# Patient Record
Sex: FEMALE
[De-identification: names, ages, dates, MRNs, and addresses within clinical notes are randomized per-mention and may not be internally consistent; named-entity substitution may affect disease eponyms.]

---

## 2021-11-19 ENCOUNTER — RX ONLY (RX ONLY)
Age: 66
End: 2021-11-19

## 2021-11-19 ENCOUNTER — OTHER- (OUTPATIENT)
Dept: URBAN - NONMETROPOLITAN AREA CLINIC 5 | Facility: CLINIC | Age: 66
Setting detail: DERMATOLOGY
End: 2021-11-19

## 2021-11-19 DIAGNOSIS — D48.5 NEOPLASM OF UNCERTAIN BEHAVIOR OF SKIN: ICD-10-CM

## 2021-11-19 PROCEDURE — 99203 OFFICE O/P NEW LOW 30 MIN: CPT

## 2021-11-19 RX ORDER — TRIAMCINOLONE ACETONIDE 1 MG/G
1 AS DIRECTED CREAM TOPICAL TWICE A DAY
Qty: 453.6 | Refills: 0
Start: 2021-11-19

## 2021-12-08 ENCOUNTER — FOLLOW-UP (OUTPATIENT)
Dept: URBAN - METROPOLITAN AREA CLINIC 8 | Facility: CLINIC | Age: 66
Setting detail: DERMATOLOGY
End: 2021-12-08

## 2021-12-08 DIAGNOSIS — L81.1 CHLOASMA: ICD-10-CM

## 2021-12-08 DIAGNOSIS — Z08 ENCOUNTER FOR FOLLOW-UP EXAMINATION AFTER COMPLETED TREATMENT FOR MALIGNANT NEOPLASM: ICD-10-CM

## 2021-12-08 DIAGNOSIS — L71.8 OTHER ROSACEA: ICD-10-CM

## 2021-12-08 PROCEDURE — 96900 ACTINOTHERAPY UV LIGHT: CPT

## 2021-12-08 PROCEDURE — 99213 OFFICE O/P EST LOW 20 MIN: CPT

## 2021-12-13 ENCOUNTER — FOLLOW-UP (OUTPATIENT)
Dept: URBAN - METROPOLITAN AREA CLINIC 8 | Facility: CLINIC | Age: 66
Setting detail: DERMATOLOGY
End: 2021-12-13

## 2021-12-13 DIAGNOSIS — L57.0 ACTINIC KERATOSIS: ICD-10-CM

## 2021-12-13 PROCEDURE — 96900 ACTINOTHERAPY UV LIGHT: CPT

## 2021-12-22 ENCOUNTER — FOLLOW-UP (OUTPATIENT)
Dept: URBAN - METROPOLITAN AREA CLINIC 15 | Facility: CLINIC | Age: 66
Setting detail: DERMATOLOGY
End: 2021-12-22

## 2021-12-22 DIAGNOSIS — L23.9 ALLERGIC CONTACT DERMATITIS, UNSPECIFIED CAUSE: ICD-10-CM

## 2021-12-22 PROCEDURE — 17110 DESTRUCT B9 LESION 1-14: CPT

## 2021-12-22 PROCEDURE — 99212 OFFICE O/P EST SF 10 MIN: CPT

## 2023-12-20 ENCOUNTER — APPOINTMENT (OUTPATIENT)
Dept: URBAN - METROPOLITAN AREA CLINIC 216 | Age: 68
Setting detail: DERMATOLOGY
End: 2023-12-20

## 2023-12-20 DIAGNOSIS — L82.1 OTHER SEBORRHEIC KERATOSIS: ICD-10-CM

## 2023-12-20 DIAGNOSIS — L81.4 OTHER MELANIN HYPERPIGMENTATION: ICD-10-CM

## 2023-12-20 DIAGNOSIS — D18.0 HEMANGIOMA: ICD-10-CM

## 2023-12-20 DIAGNOSIS — D22 MELANOCYTIC NEVI: ICD-10-CM

## 2023-12-20 DIAGNOSIS — D485 NEOPLASM OF UNCERTAIN BEHAVIOR OF SKIN: ICD-10-CM

## 2023-12-20 PROBLEM — D22.9 MELANOCYTIC NEVI, UNSPECIFIED: Status: ACTIVE | Noted: 2023-12-20

## 2023-12-20 PROBLEM — D18.01 HEMANGIOMA OF SKIN AND SUBCUTANEOUS TISSUE: Status: ACTIVE | Noted: 2023-12-20

## 2023-12-20 PROBLEM — D48.5 NEOPLASM OF UNCERTAIN BEHAVIOR OF SKIN: Status: ACTIVE | Noted: 2023-12-20

## 2023-12-20 PROCEDURE — OTHER BIOPSY BY SHAVE METHOD: OTHER

## 2023-12-20 PROCEDURE — OTHER REASSURANCE: OTHER

## 2023-12-20 PROCEDURE — 11102 TANGNTL BX SKIN SINGLE LES: CPT

## 2023-12-20 PROCEDURE — OTHER COUNSELING: OTHER

## 2023-12-20 PROCEDURE — 99213 OFFICE O/P EST LOW 20 MIN: CPT | Mod: 25

## 2023-12-20 ASSESSMENT — LOCATION DETAILED DESCRIPTION DERM
LOCATION DETAILED: RIGHT CENTRAL MALAR CHEEK
LOCATION DETAILED: LEFT SUPERIOR UPPER BACK
LOCATION DETAILED: LEFT ANTERIOR SHOULDER
LOCATION DETAILED: LEFT DISTAL POSTERIOR UPPER ARM
LOCATION DETAILED: RIGHT MEDIAL SUPERIOR CHEST
LOCATION DETAILED: RIGHT ANTERIOR SHOULDER
LOCATION DETAILED: LEFT SUPERIOR LATERAL UPPER BACK
LOCATION DETAILED: INFERIOR THORACIC SPINE

## 2023-12-20 ASSESSMENT — LOCATION SIMPLE DESCRIPTION DERM
LOCATION SIMPLE: RIGHT SHOULDER
LOCATION SIMPLE: RIGHT CHEEK
LOCATION SIMPLE: LEFT SHOULDER
LOCATION SIMPLE: CHEST
LOCATION SIMPLE: LEFT POSTERIOR UPPER ARM
LOCATION SIMPLE: UPPER BACK
LOCATION SIMPLE: LEFT UPPER BACK

## 2023-12-20 ASSESSMENT — LOCATION ZONE DERM
LOCATION ZONE: TRUNK
LOCATION ZONE: FACE
LOCATION ZONE: ARM

## 2023-12-20 NOTE — PROCEDURE: BIOPSY BY SHAVE METHOD
Detail Level: Detailed
Depth Of Biopsy: dermis
Was A Bandage Applied: Yes
Size Of Lesion In Cm: 0
Biopsy Type: H and E
Biopsy Method: double edge Personna blade
Anesthesia Type: 1% lidocaine with epinephrine
Anesthesia Volume In Cc: 0.5
Hemostasis: Drysol
Wound Care: Petrolatum
Dressing: Band-Aid
Destruction After The Procedure: No
Type Of Destruction Used: Curettage
Curettage Text: The wound bed was treated with curettage after the biopsy was performed.
Cryotherapy Text: The wound bed was treated with cryotherapy after the biopsy was performed.
Electrodesiccation Text: The wound bed was treated with electrodesiccation after the biopsy was performed.
Electrodesiccation And Curettage Text: The wound bed was treated with electrodesiccation and curettage after the biopsy was performed.
Silver Nitrate Text: The wound bed was treated with silver nitrate after the biopsy was performed.
Lab: [id number]
Consent: Written consent was obtained and risks were reviewed including but not limited to scarring, infection, bleeding, scabbing, incomplete removal, nerve damage and allergy to anesthesia.
Post-Care Instructions: I reviewed with the patient in detail post-care instructions. Patient is to clean the wound with soap and water twice daily, and then apply Vaseline and keep covered, until healed.
Notification Instructions: Patient will be notified of biopsy results. However, patient instructed to call the office if not contacted within 2 weeks.
Billing Type: Third-Party Bill
Information: Selecting Yes will display possible errors in your note based on the variables you have selected. This validation is only offered as a suggestion for you. PLEASE NOTE THAT THE VALIDATION TEXT WILL BE REMOVED WHEN YOU FINALIZE YOUR NOTE. IF YOU WANT TO FAX A PRELIMINARY NOTE YOU WILL NEED TO TOGGLE THIS TO 'NO' IF YOU DO NOT WANT IT IN YOUR FAXED NOTE.

## 2025-01-06 ENCOUNTER — APPOINTMENT (OUTPATIENT)
Dept: URBAN - METROPOLITAN AREA CLINIC 216 | Age: 70
Setting detail: DERMATOLOGY
End: 2025-01-07

## 2025-01-06 DIAGNOSIS — R20.2 PARESTHESIA OF SKIN: ICD-10-CM

## 2025-01-06 DIAGNOSIS — D18.0 HEMANGIOMA: ICD-10-CM

## 2025-01-06 DIAGNOSIS — D22 MELANOCYTIC NEVI: ICD-10-CM

## 2025-01-06 DIAGNOSIS — L82.1 OTHER SEBORRHEIC KERATOSIS: ICD-10-CM

## 2025-01-06 DIAGNOSIS — L81.4 OTHER MELANIN HYPERPIGMENTATION: ICD-10-CM

## 2025-01-06 PROBLEM — D22.9 MELANOCYTIC NEVI, UNSPECIFIED: Status: ACTIVE | Noted: 2025-01-06

## 2025-01-06 PROBLEM — D18.01 HEMANGIOMA OF SKIN AND SUBCUTANEOUS TISSUE: Status: ACTIVE | Noted: 2025-01-06

## 2025-01-06 PROCEDURE — 99213 OFFICE O/P EST LOW 20 MIN: CPT

## 2025-01-06 PROCEDURE — OTHER REASSURANCE: OTHER

## 2025-01-06 PROCEDURE — OTHER COUNSELING: OTHER

## 2025-01-06 ASSESSMENT — LOCATION SIMPLE DESCRIPTION DERM
LOCATION SIMPLE: LEFT SHOULDER
LOCATION SIMPLE: CHEST
LOCATION SIMPLE: UPPER BACK
LOCATION SIMPLE: LEFT UPPER BACK
LOCATION SIMPLE: RIGHT CHEEK
LOCATION SIMPLE: RIGHT SHOULDER

## 2025-01-06 ASSESSMENT — LOCATION ZONE DERM
LOCATION ZONE: TRUNK
LOCATION ZONE: FACE
LOCATION ZONE: ARM

## 2025-01-06 ASSESSMENT — LOCATION DETAILED DESCRIPTION DERM
LOCATION DETAILED: INFERIOR THORACIC SPINE
LOCATION DETAILED: LEFT MID-UPPER BACK
LOCATION DETAILED: RIGHT MEDIAL SUPERIOR CHEST
LOCATION DETAILED: LEFT ANTERIOR SHOULDER
LOCATION DETAILED: RIGHT ANTERIOR SHOULDER
LOCATION DETAILED: LEFT SUPERIOR UPPER BACK
LOCATION DETAILED: LEFT SUPERIOR LATERAL UPPER BACK
LOCATION DETAILED: RIGHT CENTRAL MALAR CHEEK

## 2025-05-23 ENCOUNTER — APPOINTMENT (OUTPATIENT)
Dept: URBAN - METROPOLITAN AREA CLINIC 216 | Age: 70
Setting detail: DERMATOLOGY
End: 2025-05-26

## 2025-05-23 DIAGNOSIS — L30.8 OTHER SPECIFIED DERMATITIS: ICD-10-CM

## 2025-05-23 PROCEDURE — OTHER COUNSELING: OTHER

## 2025-05-23 PROCEDURE — OTHER ADDITIONAL NOTES: OTHER

## 2025-05-23 PROCEDURE — OTHER PRESCRIPTION: OTHER

## 2025-05-23 PROCEDURE — 99213 OFFICE O/P EST LOW 20 MIN: CPT

## 2025-05-23 RX ORDER — CLOBETASOL PROPIONATE 0.5 MG/G
OINTMENT TOPICAL
Qty: 30 | Refills: 2 | Status: ERX | COMMUNITY
Start: 2025-05-23

## 2025-05-23 ASSESSMENT — LOCATION SIMPLE DESCRIPTION DERM
LOCATION SIMPLE: LEFT INDEX FINGER
LOCATION SIMPLE: RIGHT INDEX FINGER

## 2025-05-23 ASSESSMENT — LOCATION DETAILED DESCRIPTION DERM
LOCATION DETAILED: PERIUNGUAL SKIN RIGHT INDEX FINGER
LOCATION DETAILED: LEFT DISTAL DORSAL INDEX FINGER

## 2025-05-23 ASSESSMENT — SEVERITY ASSESSMENT: SEVERITY: MILD TO MODERATE

## 2025-05-23 ASSESSMENT — PAIN INTENSITY VAS: HOW INTENSE IS YOUR PAIN 0 BEING NO PAIN, 10 BEING THE MOST SEVERE PAIN POSSIBLE?: 3/10 PAIN

## 2025-05-23 ASSESSMENT — BSA RASH: BSA RASH: 1

## 2025-05-23 ASSESSMENT — LOCATION ZONE DERM: LOCATION ZONE: FINGER
